# Patient Record
Sex: FEMALE | Race: WHITE | NOT HISPANIC OR LATINO | URBAN - METROPOLITAN AREA
[De-identification: names, ages, dates, MRNs, and addresses within clinical notes are randomized per-mention and may not be internally consistent; named-entity substitution may affect disease eponyms.]

---

## 2020-01-16 NOTE — PATIENT DISCUSSION
1/16/20 based on OCT of angle and gonio angles are deemed occ now [facility]

## 2020-03-25 NOTE — PROCEDURE NOTE: SURGICAL
Prior to commencing surgery, patient identification, surgical procedure, site, and side were confirmed by Dr. Torrie Garcia. Following topical proparacaine anesthesia, the patient was positioned at the YAG laser, a contact lens coupled to the cornea of the right eye with methylcellulose and a peripheral iridotomy placed using 3.0 Mj G11 without complication. Attention was turned to the left eye and the patient was positioned at the YAG laser, a contact lens coupled to the cornea with methylcellulose and a peripheral iridotomy placed using 3.0 Mj x 26 without complication. Excess methylcellulose was washed from both eyes, one drop of Econopred Plus 1% instilled and the patient returned to the holding area having tolerated the procedure well and without complication. <br />[facility]:[id number]<MW /><br />

## 2020-03-30 NOTE — PATIENT DISCUSSION
Elevated IOP after LPI OU. Question of steroid responder. Pt to continue Pred as scheduled but will add Lumigan OU QHS. Sample given. RTC in 1-2 weeks for IOP.

## 2020-04-20 NOTE — PATIENT DISCUSSION
Angles improved but still remains narrow. Recommend pt continuing Lumigan and follow up in 3-4 months with Dr Seymour Vera.

## 2020-07-28 NOTE — PATIENT DISCUSSION
Angles improved but still remains narrow. Recommend pt continuing Lumigan and follow up in 3-4 months with Dr Brian Blackwell.

## 2020-07-28 NOTE — PATIENT DISCUSSION
See # 2.  baseline OCT today shows healthy RNFL.  IOP well-controlled on latanoprost.  CPM but will finish current supply in about 6 weeks.   will check IOP 2-3 weeks after and see what we get with that wash-out.

## 2020-10-09 ENCOUNTER — IMPORTED ENCOUNTER (OUTPATIENT)
Dept: URBAN - METROPOLITAN AREA CLINIC 31 | Facility: CLINIC | Age: 65
End: 2020-10-09

## 2020-10-09 PROBLEM — H25.813: Noted: 2020-10-09

## 2020-10-09 PROCEDURE — 99204 OFFICE O/P NEW MOD 45 MIN: CPT

## 2020-10-09 PROCEDURE — 92015 DETERMINE REFRACTIVE STATE: CPT

## 2020-10-09 NOTE — PATIENT DISCUSSION
Discussed the risks benefits alternatives and limitations of cataract surgery including infection bleeding loss of vision retinal tears detachment. The patient stated a full understanding and a desire to proceed with the procedure in both eyes. Refractive options were reviewed. Patient has elected to be optimized for distance vision in both eyes. The patient will still need glasses for reading and to possibly fine tune distance vision. Schedule KPE/IOL OD/OS when pt. desires. Can get glasses rx if prefers.  can schedule if desiresOD/OSLAF to follow after surgery

## 2021-08-07 NOTE — PATIENT DISCUSSION
7/28/2020: resolved S/P LPI OU.  VERY low risk going forward for angle closure.
9/30/20: has been off latanoprost about 2 plus weeks now and IOP is under 20.  want target <21 all visits.  watch IOP and watch for RNFL/VF changes over time.
Angles improved but still remains narrow. Recommend pt continuing Lumigan and follow up in 3-4 months with Dr Raymon Johnson.
Discussed condition and exacerbating conditions/situations (e.g., dry/arid environments, overhead fans, air conditioners, side effect of medications).
Discussed lid hygiene, warm compress and eyelid wash.
Glasses Rx given.
Monitor.
Patient given Rx for glasses.
Recommended artificial tears to use: 1 drop 4x a day in both eyes.
See # 2.  baseline OCT today shows healthy RNFL.  IOP well-controlled on latanoprost.  CPM but will finish current supply in about 6 weeks.   will check IOP 2-3 weeks after and see what we get with that wash-out.
cold compresses.  NO EYE RUBBING.  Maxitrol BID to lids for one week then QD to lids for one week.
Pt with diet controlled GDM post-partum day 1

## 2021-11-02 NOTE — PATIENT DISCUSSION
Angles improved but still remains narrow. Recommend pt continuing Lumigan and follow up in 3-4 months with Dr Bob Dodd.

## 2021-11-02 NOTE — PATIENT DISCUSSION
9/30/20: has been off latanoprost about 2 plus weeks now and IOP is under 20.  want target <21 all visits.  watch IOP and watch for RNFL/VF changes over time.

## 2021-11-02 NOTE — PATIENT DISCUSSION
11/2/2021:  RNFL stable.  disc stable.  follow as low risk suspect.  obs only at this time.  get HVF at [facility] next year.

## 2022-04-02 ASSESSMENT — VISUAL ACUITY
OD_CC: 20/40
OS_CC: 20/40
OD_GLARE: 20/70
OS_GLARE: 20/70

## 2022-04-02 ASSESSMENT — TONOMETRY
OD_IOP_MMHG: 15
OS_IOP_MMHG: 15

## 2022-07-09 ENCOUNTER — TELEPHONE ENCOUNTER (OUTPATIENT)
Dept: URBAN - METROPOLITAN AREA CLINIC 121 | Facility: CLINIC | Age: 67
End: 2022-07-09

## 2022-07-09 RX ORDER — OMEPRAZOLE 40 MG/1
ONCE A DAY 30-60 MINUTES PRIOR TO EVENING MEAL CAPSULE, DELAYED RELEASE ORAL ONCE A DAY
Refills: 3 | OUTPATIENT
Start: 2018-03-08 | End: 2018-10-03

## 2022-07-09 RX ORDER — QUETIAPINE 400 MG/1
TABLET, FILM COATED ORAL ONCE A DAY
Refills: 0 | OUTPATIENT
Start: 2019-08-22 | End: 2019-08-22

## 2022-07-09 RX ORDER — ZOLPIDEM TARTRATE 5 MG
PRN TABLET ORAL
Refills: 0 | OUTPATIENT
Start: 2018-02-02 | End: 2018-06-28

## 2022-07-09 RX ORDER — QUETIAPINE 400 MG/1
TABLET, FILM COATED ORAL
Refills: 0 | OUTPATIENT
Start: 2018-03-08 | End: 2018-06-28

## 2022-07-09 RX ORDER — DIAZEPAM 5 MG/1
TABLET ORAL THREE TIMES A DAY
Refills: 0 | OUTPATIENT
Start: 2018-02-02 | End: 2018-06-28

## 2022-07-09 RX ORDER — HYDROCHLOROTHIAZIDE 25 MG/1
TABLET ORAL ONCE A DAY
Refills: 0 | OUTPATIENT
Start: 2019-08-22 | End: 2019-08-22

## 2022-07-09 RX ORDER — DIAZEPAM 5 MG/1
TABLET ORAL THREE TIMES A DAY
Refills: 0 | OUTPATIENT
Start: 2017-03-24 | End: 2017-08-25

## 2022-07-09 RX ORDER — HYDROCHLOROTHIAZIDE 25 MG/1
TABLET ORAL ONCE A DAY
Refills: 0 | OUTPATIENT
Start: 2018-06-28 | End: 2019-01-31

## 2022-07-09 RX ORDER — PHENAZOPYRIDINE HYDROCHLORIDE 100 MG/1
TABLET, COATED ORAL ONCE A DAY
Refills: 0 | OUTPATIENT
Start: 2017-08-25 | End: 2018-02-02

## 2022-07-09 RX ORDER — ZOLPIDEM TARTRATE 5 MG
PRN TABLET ORAL
Refills: 0 | OUTPATIENT
Start: 2019-01-31 | End: 2019-01-31

## 2022-07-09 RX ORDER — OXYCODONE HYDROCHLORIDE 10 MG/1
TABLET ORAL
Refills: 0 | OUTPATIENT
Start: 2018-02-02 | End: 2018-06-28

## 2022-07-09 RX ORDER — OMEPRAZOLE 40 MG/1
CAPSULE, DELAYED RELEASE ORAL ONCE A DAY
Refills: 0 | OUTPATIENT
Start: 2019-01-31 | End: 2019-08-22

## 2022-07-09 RX ORDER — HYDROCHLOROTHIAZIDE 25 MG/1
TABLET ORAL ONCE A DAY
Refills: 0 | OUTPATIENT
Start: 2019-01-31 | End: 2019-08-22

## 2022-07-09 RX ORDER — DIAZEPAM 5 MG/1
TABLET ORAL THREE TIMES A DAY
Refills: 0 | OUTPATIENT
Start: 2019-01-31 | End: 2019-01-31

## 2022-07-09 RX ORDER — HYDROCHLOROTHIAZIDE 25 MG/1
TABLET ORAL ONCE A DAY
Refills: 0 | OUTPATIENT
Start: 2017-08-25 | End: 2018-06-28

## 2022-07-09 RX ORDER — ARIPIPRAZOLE 10 MG/1
UNSURE OF DOSAGE TABLET ORAL ONCE A DAY
Refills: 0 | OUTPATIENT
Start: 2019-01-31 | End: 2019-01-31

## 2022-07-09 RX ORDER — VORTIOXETINE 10 MG/1
TABLET, FILM COATED ORAL ONCE A DAY
Refills: 0 | OUTPATIENT
Start: 2018-03-08 | End: 2018-06-28

## 2022-07-09 RX ORDER — DIAZEPAM 5 MG/1
TABLET ORAL THREE TIMES A DAY
Refills: 0 | OUTPATIENT
Start: 2017-08-25 | End: 2018-02-02

## 2022-07-09 RX ORDER — FLUCONAZOLE 100 MG/1
2 TABLETS ON DAY ONE, FOLLOWING DAYS ONE TABLET TABLET ORAL
Refills: 0 | OUTPATIENT
Start: 2017-10-17 | End: 2019-08-22

## 2022-07-09 RX ORDER — OMEGA-3S/DHA/EPA/FISH OIL 980-1400MG
CAPSULE,DELAYED RELEASE (ENTERIC COATED) ORAL ONCE A DAY
Refills: 0 | OUTPATIENT
Start: 2019-08-22 | End: 2019-08-22

## 2022-07-09 RX ORDER — OXYCODONE HYDROCHLORIDE 10 MG/1
TABLET ORAL
Refills: 0 | OUTPATIENT
Start: 2017-08-25 | End: 2018-02-02

## 2022-07-09 RX ORDER — DIAZEPAM 5 MG/1
TABLET ORAL THREE TIMES A DAY
Refills: 0 | OUTPATIENT
Start: 2018-06-28 | End: 2019-01-31

## 2022-07-09 RX ORDER — ZOLPIDEM TARTRATE 5 MG
PRN TABLET ORAL
Refills: 0 | OUTPATIENT
Start: 2018-06-28 | End: 2019-01-31

## 2022-07-09 RX ORDER — ZOLPIDEM TARTRATE 10 MG
TABLET ORAL ONCE A DAY
Refills: 0 | OUTPATIENT
Start: 2017-03-24 | End: 2017-08-25

## 2022-07-09 RX ORDER — OMEPRAZOLE 40 MG/1
CAPSULE, DELAYED RELEASE ORAL ONCE A DAY
Refills: 0 | OUTPATIENT
Start: 2019-08-22 | End: 2019-08-22

## 2022-07-09 RX ORDER — ARIPIPRAZOLE 10 MG/1
UNSURE OF DOSAGE TABLET ORAL ONCE A DAY
Refills: 0 | OUTPATIENT
Start: 2018-06-28 | End: 2019-01-31

## 2022-07-09 RX ORDER — OXYCODONE HYDROCHLORIDE 10 MG/1
TABLET ORAL
Refills: 0 | OUTPATIENT
Start: 2017-03-24 | End: 2017-08-25

## 2022-07-09 RX ORDER — PHENAZOPYRIDINE HYDROCHLORIDE 100 MG/1
TABLET, COATED ORAL ONCE A DAY
Refills: 0 | OUTPATIENT
Start: 2017-03-24 | End: 2017-08-25

## 2022-07-09 RX ORDER — ARIPIPRAZOLE 5 MG/1
TABLET ORAL ONCE A DAY
Refills: 0 | OUTPATIENT
Start: 2017-08-25 | End: 2018-02-02

## 2022-07-09 RX ORDER — OMEPRAZOLE 40 MG/1
TAKE ONE CAPSULE BY MOUTH ONCE A DAY, 30 TO 60 MINUTES PRIOR TO EVENING MEAL CAPSULE, DELAYED RELEASE ORAL
Refills: 3 | OUTPATIENT
Start: 2018-10-03 | End: 2019-11-05

## 2022-07-09 RX ORDER — PHENAZOPYRIDINE HYDROCHLORIDE 100 MG/1
TABLET, COATED ORAL ONCE A DAY
Refills: 0 | OUTPATIENT
Start: 2018-02-02 | End: 2018-03-08

## 2022-07-09 RX ORDER — OMEGA-3S/DHA/EPA/FISH OIL 980-1400MG
CAPSULE,DELAYED RELEASE (ENTERIC COATED) ORAL ONCE A DAY
Refills: 0 | OUTPATIENT
Start: 2019-01-31 | End: 2019-08-22

## 2022-07-09 RX ORDER — QUETIAPINE 400 MG/1
TABLET, FILM COATED ORAL ONCE A DAY
Refills: 0 | OUTPATIENT
Start: 2019-01-31 | End: 2019-08-22

## 2022-07-09 RX ORDER — DIAZEPAM 5 MG/1
TABLET ORAL AS NEEDED
Refills: 0 | OUTPATIENT
Start: 2019-01-31 | End: 2019-08-22

## 2022-07-09 RX ORDER — MV-MIN/FOLIC/VIT K/LYCOP/COQ10 200-100MCG
CAPSULE ORAL ONCE A DAY
Refills: 0 | OUTPATIENT
Start: 2019-01-31 | End: 2019-08-22

## 2022-07-09 RX ORDER — HYDROCHLOROTHIAZIDE 25 MG/1
TABLET ORAL ONCE A DAY
Refills: 0 | OUTPATIENT
Start: 2017-03-24 | End: 2017-08-25

## 2022-07-09 RX ORDER — ZOLPIDEM TARTRATE 10 MG
TABLET ORAL ONCE A DAY
Refills: 0 | OUTPATIENT
Start: 2017-08-25 | End: 2018-02-02

## 2022-07-09 RX ORDER — ARIPIPRAZOLE 5 MG/1
TABLET ORAL ONCE A DAY
Refills: 0 | OUTPATIENT
Start: 2017-03-24 | End: 2017-08-25

## 2022-07-10 ENCOUNTER — TELEPHONE ENCOUNTER (OUTPATIENT)
Dept: URBAN - METROPOLITAN AREA CLINIC 121 | Facility: CLINIC | Age: 67
End: 2022-07-10

## 2022-07-10 RX ORDER — LACTULOSE 10 G/15ML
USE AS DIRECTED 15-20ML BID SOLUTION ORAL
Refills: 5 | Status: ACTIVE | COMMUNITY
Start: 2018-06-14

## 2022-07-10 RX ORDER — ARIPIPRAZOLE 10 MG/1
TABLET ORAL ONCE A DAY
Refills: 0 | Status: ACTIVE | COMMUNITY
Start: 2019-08-22

## 2022-07-10 RX ORDER — ESTRADIOL 0.5 MG/1
TABLET ORAL ONCE A DAY
Refills: 0 | Status: ACTIVE | COMMUNITY
Start: 2019-08-22

## 2022-07-10 RX ORDER — OXYCODONE HYDROCHLORIDE 10 MG/1
TABLET ORAL
Refills: 0 | Status: ACTIVE | COMMUNITY
Start: 2019-08-22

## 2022-07-10 RX ORDER — MV-MIN/FOLIC/VIT K/LYCOP/COQ10 200-100MCG
CAPSULE ORAL ONCE A DAY
Refills: 0 | Status: ACTIVE | COMMUNITY
Start: 2019-08-22

## 2022-07-10 RX ORDER — DIAZEPAM 5 MG/1
TABLET ORAL AS NEEDED
Refills: 0 | Status: ACTIVE | COMMUNITY
Start: 2019-08-22

## 2022-07-10 RX ORDER — DULOXETINE 60 MG/1
CAPSULE, DELAYED RELEASE PELLETS ORAL ONCE A DAY
Refills: 0 | Status: ACTIVE | COMMUNITY
Start: 2019-08-22

## 2022-07-10 RX ORDER — ESTRADIOL 0.03 MG/D
PATCH TRANSDERMAL TAKE AS DIRECTED
Refills: 0 | Status: ACTIVE | COMMUNITY
Start: 2019-08-22

## 2022-07-10 RX ORDER — OMEPRAZOLE 40 MG/1
ONCE A DAY CAPSULE, DELAYED RELEASE ORAL ONCE A DAY
Refills: 2 | Status: ACTIVE | COMMUNITY
Start: 2019-11-05

## 2022-07-10 RX ORDER — MEDROXYPROGESTERONE ACETATE 2.5 MG/1
TABLET ORAL ONCE A DAY
Refills: 0 | Status: ACTIVE | COMMUNITY
Start: 2019-08-22

## 2022-07-30 ENCOUNTER — TELEPHONE ENCOUNTER (OUTPATIENT)
Age: 67
End: 2022-07-30

## 2022-07-31 ENCOUNTER — TELEPHONE ENCOUNTER (OUTPATIENT)
Age: 67
End: 2022-07-31